# Patient Record
Sex: FEMALE | Race: ASIAN | NOT HISPANIC OR LATINO | Employment: FULL TIME | URBAN - METROPOLITAN AREA
[De-identification: names, ages, dates, MRNs, and addresses within clinical notes are randomized per-mention and may not be internally consistent; named-entity substitution may affect disease eponyms.]

---
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
## 2021-04-12 ENCOUNTER — HOSPITAL ENCOUNTER (OUTPATIENT)
Dept: NUCLEAR MEDICINE | Facility: CLINIC | Age: 21
Setting detail: OBSERVATION
Discharge: HOME | End: 2021-04-12
Attending: NURSE PRACTITIONER | Admitting: NURSE PRACTITIONER
Payer: COMMERCIAL

## 2021-04-12 DIAGNOSIS — R63.4 WEIGHT LOSS: ICD-10-CM

## 2021-04-12 DIAGNOSIS — R63.0 DECREASED APPETITE: ICD-10-CM

## 2021-04-12 DIAGNOSIS — R11.0 NAUSEA: ICD-10-CM

## 2021-04-12 DIAGNOSIS — R19.4 ALTERED BOWEL HABITS: ICD-10-CM

## 2021-04-12 PROCEDURE — A9537 TC99M MEBROFENIN: HCPCS | Performed by: NURSE PRACTITIONER

## 2021-04-12 PROCEDURE — 78227 HEPATOBIL SYST IMAGE W/DRUG: CPT

## 2021-04-12 PROCEDURE — 343N000001 HC RX 343: Performed by: NURSE PRACTITIONER

## 2021-04-12 RX ORDER — KIT FOR THE PREPARATION OF TECHNETIUM TC 99M MEBROFENIN 45 MG/10ML
6 INJECTION, POWDER, LYOPHILIZED, FOR SOLUTION INTRAVENOUS ONCE
Status: COMPLETED | OUTPATIENT
Start: 2021-04-12 | End: 2021-04-12

## 2021-04-12 RX ADMIN — MEBROFENIN 6 MCI.: 45 INJECTION, POWDER, LYOPHILIZED, FOR SOLUTION INTRAVENOUS at 13:29

## 2021-04-20 ENCOUNTER — HOSPITAL ENCOUNTER (OUTPATIENT)
Dept: ULTRASOUND IMAGING | Facility: CLINIC | Age: 21
Discharge: HOME | End: 2021-04-20
Attending: NURSE PRACTITIONER | Admitting: NURSE PRACTITIONER
Payer: COMMERCIAL

## 2021-04-20 DIAGNOSIS — R11.0 NAUSEA: ICD-10-CM

## 2021-04-20 DIAGNOSIS — R63.4 WEIGHT LOSS: ICD-10-CM

## 2021-04-20 DIAGNOSIS — R19.4 ALTERED BOWEL HABITS: ICD-10-CM

## 2021-04-20 DIAGNOSIS — R63.0 DECREASED APPETITE: ICD-10-CM

## 2021-04-20 PROCEDURE — 76705 ECHO EXAM OF ABDOMEN: CPT

## 2021-06-01 ENCOUNTER — RECORDS - HEALTHEAST (OUTPATIENT)
Dept: ADMINISTRATIVE | Facility: CLINIC | Age: 21
End: 2021-06-01

## 2025-02-04 ENCOUNTER — HOSPITAL ENCOUNTER (EMERGENCY)
Facility: CLINIC | Age: 25
Discharge: HOME | End: 2025-02-04
Attending: STUDENT IN AN ORGANIZED HEALTH CARE EDUCATION/TRAINING PROGRAM | Admitting: STUDENT IN AN ORGANIZED HEALTH CARE EDUCATION/TRAINING PROGRAM
Payer: COMMERCIAL

## 2025-02-04 VITALS
TEMPERATURE: 98.5 F | DIASTOLIC BLOOD PRESSURE: 70 MMHG | BODY MASS INDEX: 21.6 KG/M2 | HEIGHT: 60 IN | RESPIRATION RATE: 16 BRPM | OXYGEN SATURATION: 98 % | HEART RATE: 98 BPM | WEIGHT: 110 LBS | SYSTOLIC BLOOD PRESSURE: 108 MMHG

## 2025-02-04 DIAGNOSIS — T78.40XA ALLERGIC REACTION, INITIAL ENCOUNTER: ICD-10-CM

## 2025-02-04 PROCEDURE — 96375 TX/PRO/DX INJ NEW DRUG ADDON: CPT

## 2025-02-04 PROCEDURE — 96374 THER/PROPH/DIAG INJ IV PUSH: CPT

## 2025-02-04 PROCEDURE — 250N000009 HC RX 250: Performed by: STUDENT IN AN ORGANIZED HEALTH CARE EDUCATION/TRAINING PROGRAM

## 2025-02-04 PROCEDURE — 99285 EMERGENCY DEPT VISIT HI MDM: CPT | Mod: 25

## 2025-02-04 PROCEDURE — 250N000011 HC RX IP 250 OP 636: Performed by: STUDENT IN AN ORGANIZED HEALTH CARE EDUCATION/TRAINING PROGRAM

## 2025-02-04 PROCEDURE — 96372 THER/PROPH/DIAG INJ SC/IM: CPT | Performed by: STUDENT IN AN ORGANIZED HEALTH CARE EDUCATION/TRAINING PROGRAM

## 2025-02-04 RX ORDER — DEXAMETHASONE SODIUM PHOSPHATE 10 MG/ML
10 INJECTION, SOLUTION INTRAMUSCULAR; INTRAVENOUS ONCE
Status: COMPLETED | OUTPATIENT
Start: 2025-02-04 | End: 2025-02-04

## 2025-02-04 RX ORDER — EPINEPHRINE 0.3 MG/.3ML
0.3 INJECTION SUBCUTANEOUS
Qty: 2 EACH | Refills: 0 | Status: SHIPPED | OUTPATIENT
Start: 2025-02-04

## 2025-02-04 RX ORDER — DIPHENHYDRAMINE HYDROCHLORIDE 50 MG/ML
50 INJECTION INTRAMUSCULAR; INTRAVENOUS ONCE
Status: COMPLETED | OUTPATIENT
Start: 2025-02-04 | End: 2025-02-04

## 2025-02-04 RX ADMIN — DEXAMETHASONE SODIUM PHOSPHATE 10 MG: 10 INJECTION, SOLUTION INTRAMUSCULAR; INTRAVENOUS at 07:19

## 2025-02-04 RX ADMIN — DIPHENHYDRAMINE HYDROCHLORIDE 50 MG: 50 INJECTION, SOLUTION INTRAMUSCULAR; INTRAVENOUS at 07:18

## 2025-02-04 RX ADMIN — FAMOTIDINE 20 MG: 10 INJECTION, SOLUTION INTRAVENOUS at 07:22

## 2025-02-04 RX ADMIN — EPINEPHRINE 0.3 MG: 1 INJECTION INTRAMUSCULAR; INTRAVENOUS; SUBCUTANEOUS at 07:35

## 2025-02-04 ASSESSMENT — COLUMBIA-SUICIDE SEVERITY RATING SCALE - C-SSRS
2. HAVE YOU ACTUALLY HAD ANY THOUGHTS OF KILLING YOURSELF IN THE PAST MONTH?: NO
1. IN THE PAST MONTH, HAVE YOU WISHED YOU WERE DEAD OR WISHED YOU COULD GO TO SLEEP AND NOT WAKE UP?: NO
6. HAVE YOU EVER DONE ANYTHING, STARTED TO DO ANYTHING, OR PREPARED TO DO ANYTHING TO END YOUR LIFE?: NO

## 2025-02-04 ASSESSMENT — ACTIVITIES OF DAILY LIVING (ADL)
ADLS_ACUITY_SCORE: 41

## 2025-02-04 NOTE — ED TRIAGE NOTES
Pt went to a provider to have air sculpting done yesterday morning. Was given iv antibiotic before procedure that she had a reaction to including hives and itching and face swelling. Was told it was a cephalosporin. She was given an oral benadryl and told to take every 6 hours and was sent home.   She does not feel that this is resolving with just benadryl.  Her procedure was cancelled.

## 2025-02-04 NOTE — LETTER
February 4, 2025      To Whom It May Concern:      Carli Nielson was seen in our Emergency Department today, 02/04/25.  She may return to work improved.    Sincerely,    Nettie NAILS RN  Electronically signed

## 2025-02-04 NOTE — DISCHARGE INSTRUCTIONS
Return to the emergency department if symptoms are worsening, become concerning, or for any other concerns. Follow-up with your doctor routinely.     Discharge Instructions  Allergic Reaction    An allergic reaction can result in a rash, itching, swelling, watery eyes, or a runny nose. A serious reaction can cause swelling of your mouth or throat, or difficulty breathing (wheezing). The most serious allergy is called anaphylaxis, and can be life-threatening. Many allergies result in hives, also called urticaria.       An allergy happens when the body s natural defense system (immune system) overreacts to something. The thing that triggers your allergic reaction is called an allergen. The first time you are exposed to your allergen, you may not have any reaction, but the body makes a protein called an antibody. The antibody lets the body recognize and remember the allergen.  Every time you are exposed to your allergen you get more antibody and your reaction can be more severe.      Generally, every Emergency Department visit should have a follow-up clinic visit with either a primary or a specialty clinic/provider. Please follow-up as instructed by your emergency provider today.    Call 911 if you have:  Swelling of the lips, tongue or throat.  Hoarse voice, drooling or trouble breathing.  Chest pain or shortness of breath.  Fainting or unconsciousness.    What can I do to help myself?  If you know what caused your allergy, do not touch it, throw any of it away, and tell others not to have it around you. Wear a medical alert bracelet with a name of your allergen on it.  If you do not know what you are allergic to, keep a journal of everything that you are exposed to (foods, soaps, medicines, etc.). Take this with you when you follow up with your primary provider or specialist (Allergist). This may help determine what is causing the allergic reaction.  Take any medicines that are prescribed.  Antihistamines can  decrease rash or itching. You may use Benadryl  (diphenhydramine) for rash or itching according to package directions, or use a prescription antihistamine as recommended by your provider.  For significant allergic reactions, you may have been given a prescription for an epinephrine (adrenaline) auto injector. Carry this with you at all times! Use it if you are having any symptoms of anaphylaxis.  Do not be afraid to use it. Return to the Emergency Department if you use your auto injector, call 911 if it does not resolve the symptoms. It is only meant to buy time until you can get to the Emergency Department!  If you were given a prescription for medicine here today, be sure to read all of the information (including the package insert) that comes with your prescription.  This will include important information about the medicine, its side effects, and any warnings that you need to know about.  The pharmacist who fills the prescription can provide more information and answer questions you may have about the medicine.  If you have questions or concerns that the pharmacist cannot address, please call or return to the Emergency Department.   Remember that you can always come back to the Emergency Department if you are not able to see your regular provider in the amount of time listed above, if you get any new symptoms, or if there is anything that worries you.

## 2025-02-04 NOTE — ED NOTES
Pt feels like throat is more irritated, HR went from 70s-130s. Dr. Donahue notified. Epi ordered.

## 2025-02-04 NOTE — ED PROVIDER NOTES
"  Emergency Department Note      History of Present Illness     Chief Complaint   Allergic Reaction      HPI   Carli Nielson is a 25 year old female with history of asthma and chronic allergic conjunctivitis who presents to the ED with a male  for evaluation of an allergic reaction. The patient reports she was scheduled to have \"air sculpting\" done yesterday which is apparently similar to liposuction. Carli was given an IV antibiotic (a cephalosporin) before the procedure and she started to have an allergic reaction at 1030 yesterday morning. Patient states her entire body was swollen with facial swelling and diffuse hives on her hands and legs. She notes tongue, lip, eyelid, and neck swelling. Her skin feels \"tingly\" but not itchy. Carli has was given oral Benadryl and she has been taking this with OTC zyrtec every 6 hours for her symptoms, last dose at 1800 yesterday. She endorses dizziness, bilateral lower extremity swelling, tightness with breathing, and abdominal pain that has since resolved. She denies nausea, vomiting, pain with deep inspiration, or history of previous anaphylaxis. Male  present reports Carli's face does appear more swollen than normal. Her procedure yesterday morning was cancelled.            Independent Historian   Male  as detailed above.    Review of External Notes   none    Past Medical History     Medical History and Problem List   Allergic rhinitis  Anxiety  Depression  Asthma  Chronic allergic conjunctivitis  Post concussion syndrome  GERD    Medications   Flexeril   Cataflam  Mariana   Depakote   Pamelor  Fremanezumab-[facility]     Physical Exam     Patient Vitals for the past 24 hrs:   BP Temp Temp src Pulse Resp SpO2 Height Weight   02/04/25 0945 91/60 -- -- 93 13 97 % -- --   02/04/25 0918 90/51 -- -- 82 16 98 % -- --   02/04/25 0842 89/52 -- -- 96 16 98 % -- --   02/04/25 0827 108/59 -- -- 105 15 100 % -- --   02/04/25 0812 106/65 -- -- 95 21 99 % -- -- "   02/04/25 0808 -- -- -- 101 14 100 % -- --   02/04/25 0757 109/65 -- -- 110 22 100 % -- --   02/04/25 0753 -- -- -- 106 15 100 % -- --   02/04/25 0738 -- -- -- 112 17 100 % -- --   02/04/25 0727 -- -- -- (!) 129 16 -- -- --   02/04/25 0723 121/83 -- -- (!) 124 14 -- -- --   02/04/25 0712 104/68 -- -- 87 25 -- -- --   02/04/25 0648 125/79 98.5  F (36.9  C) Oral 95 18 100 % 1.524 m (5') 49.9 kg (110 lb)     Physical Exam  GENERAL: Patient well-appearing  HEAD: Atraumatic.  ORAL: Trace lip swelling no visible tongue or oropharyngeal swelling. Speaking clearly.  NECK: No rigidity  CV: RRR, no murmurs, rubs or gallops  PULM: CTAB with good aeration; no retractions, rales, rhonchi, or wheezing  ABD: Soft, nontender, nondistended, no guarding  DERM: Few scattered, blanching hives. Skin warm and dry  EXTREMITY: Moving all extremities without difficulty. No calf tenderness or peripheral edema  VASCULAR: Symmetric pulses bilaterally     Diagnostics     Lab Results   Labs Ordered and Resulted from Time of ED Arrival to Time of ED Departure - No data to display    Imaging   No orders to display     Independent Interpretation   None    ED Course      Medications Administered   Medications   diphenhydrAMINE (BENADRYL) injection 50 mg (50 mg Intravenous $Given 2/4/25 0718)   famotidine (PEPCID) injection 20 mg (20 mg Intravenous $Given 2/4/25 0722)   dexAMETHasone PF (DECADRON) injection 10 mg (10 mg Intravenous $Given 2/4/25 0719)   EPINEPHrine (ADRENALIN) kit 0.3 mg (0.3 mg Intramuscular $Given 2/4/25 0735)       Procedures   Procedures     Discussion of Management   None    ED Course   ED Course as of 02/04/25 0956   Tue Feb 04, 2025 0707 I obtained history and examined the patient as noted above.    0730 Nurse reports patient stating her symptoms are worse. Will give Epinephrine.    1747 I rechecked the patient and explained findings. She is feeling better and ready to go home. I discussed plan for discharge and patient is  agreeable with that plan.          Additional Documentation  None    Medical Decision Making / Diagnosis     CMS Diagnoses: None    MIPS       None    MDM   Carli Nielson is a 25 year old female     Symptoms most consistent with allergic reaction.     Initial VS reassuring. Patient well-appearing     DDx anaphylaxis, pheochromocytoma, carcinoid syndrome.     Given Benadryl, famotidine, steroid.  Patient felt her symptoms were getting worse and like she was getting some difficulty breathing.  Overall not much objective evidence of severe allergic reaction other than minimal lip swelling and hives but due to her symptoms being significant, given a dose of IM epinephrine and she was observed for a couple hours and she felt much better and ready to go home.    Labs and imaging not indicated.  Known culprit here.    Recommended avoiding cephalosporin.    Given prescription for EpiPen.    Patient blood pressure was borderline low points, but she has history of low blood pressure, do not think there is related to allergic response.    Patient was evaluated for acute medical emergencies. Based on my clinical assessment, I do not think any further acute management or work-up is required.  Patient stable for discharge. Given strict return precautions. All questions answered. Patient content with plan. Recommended PCP follow-up routinely.      Disposition   The patient was discharged.     Diagnosis     ICD-10-CM    1. Allergic reaction, initial encounter  T78.40XA            Discharge Medications   New Prescriptions    EPINEPHRINE (ANY BX GENERIC EQUIV) 0.3 MG/0.3ML INJECTION 2-PACK    Inject 0.3 mLs (0.3 mg) into the muscle once as needed for anaphylaxis. May repeat one time in 5-15 minutes if response to initial dose is inadequate.         Scribe Disclosure:  CYNTHIA, Leida Gonzalez, am serving as a scribe at 7:23 AM on 2/4/2025 to document services personally performed by Hadley Donahue MD based on my observations and the  provider's statements to me.        Hadley Donahue MD  02/04/25 1967

## 2025-02-15 ENCOUNTER — HEALTH MAINTENANCE LETTER (OUTPATIENT)
Age: 25
End: 2025-02-15